# Patient Record
Sex: FEMALE | Race: WHITE | NOT HISPANIC OR LATINO | Employment: FULL TIME | URBAN - METROPOLITAN AREA
[De-identification: names, ages, dates, MRNs, and addresses within clinical notes are randomized per-mention and may not be internally consistent; named-entity substitution may affect disease eponyms.]

---

## 2020-12-01 ENCOUNTER — AMBULATORY - HEALTHEAST (OUTPATIENT)
Dept: CARDIOLOGY | Facility: CLINIC | Age: 35
End: 2020-12-01

## 2020-12-03 ENCOUNTER — COMMUNICATION - HEALTHEAST (OUTPATIENT)
Dept: CARDIOLOGY | Facility: CLINIC | Age: 35
End: 2020-12-03

## 2021-06-13 NOTE — TELEPHONE ENCOUNTER
Wellness Screening Tool  Symptom Screening:  Do you have one of the following NEW symptoms:    Fever (subjective or >100.0)?  No    A new cough?  No    Shortness of breath?  No     Chills? No     New loss of taste or smell? No     Generalized body aches? No     New persistent headache? No     New sore throat? No     Nausea, vomiting, or diarrhea?  No    Within the past 2 weeks, have you been exposed to someone with a known positive illness below:    COVID-19 (known or suspected)?  No    Chicken pox?  No    Mealses?  No    Pertussis?  No    Patient notified of visitor policy- No visitors are allowed to accompany the patient at this time. Yes Per email from leadership it is ok for patient bring her baby into the clinic.  Pt informed to wear a mask: Yes  Pt notified if they develop any symptoms listed above, prior to their appointment, they are to call the clinic directly at [phone] for further instructions.  Yes  Patient's appointment status: Patient will be seen in clinic as scheduled on 12/4

## 2021-06-26 ENCOUNTER — HEALTH MAINTENANCE LETTER (OUTPATIENT)
Age: 36
End: 2021-06-26

## 2021-10-16 ENCOUNTER — HEALTH MAINTENANCE LETTER (OUTPATIENT)
Age: 36
End: 2021-10-16

## 2022-07-17 ENCOUNTER — HEALTH MAINTENANCE LETTER (OUTPATIENT)
Age: 37
End: 2022-07-17

## 2022-09-25 ENCOUNTER — HEALTH MAINTENANCE LETTER (OUTPATIENT)
Age: 37
End: 2022-09-25

## 2023-05-16 ENCOUNTER — HOSPITAL ENCOUNTER (EMERGENCY)
Facility: HOSPITAL | Age: 38
Discharge: HOME | End: 2023-05-16
Attending: EMERGENCY MEDICINE | Admitting: EMERGENCY MEDICINE
Payer: COMMERCIAL

## 2023-05-16 ENCOUNTER — APPOINTMENT (OUTPATIENT)
Dept: CT IMAGING | Facility: HOSPITAL | Age: 38
End: 2023-05-16
Attending: EMERGENCY MEDICINE
Payer: COMMERCIAL

## 2023-05-16 VITALS
BODY MASS INDEX: 38.36 KG/M2 | OXYGEN SATURATION: 99 % | HEART RATE: 84 BPM | SYSTOLIC BLOOD PRESSURE: 107 MMHG | HEIGHT: 63 IN | RESPIRATION RATE: 22 BRPM | TEMPERATURE: 98.2 F | DIASTOLIC BLOOD PRESSURE: 68 MMHG | WEIGHT: 216.49 LBS

## 2023-05-16 DIAGNOSIS — S06.0X0A CONCUSSION WITHOUT LOSS OF CONSCIOUSNESS, INITIAL ENCOUNTER: ICD-10-CM

## 2023-05-16 PROCEDURE — 250N000013 HC RX MED GY IP 250 OP 250 PS 637: Performed by: EMERGENCY MEDICINE

## 2023-05-16 PROCEDURE — 70450 CT HEAD/BRAIN W/O DYE: CPT

## 2023-05-16 PROCEDURE — 99284 EMERGENCY DEPT VISIT MOD MDM: CPT | Mod: 25

## 2023-05-16 RX ORDER — METOCLOPRAMIDE 10 MG/1
10 TABLET ORAL EVERY 8 HOURS PRN
Qty: 12 TABLET | Refills: 0 | Status: SHIPPED | OUTPATIENT
Start: 2023-05-16

## 2023-05-16 RX ORDER — METOCLOPRAMIDE 10 MG/1
10 TABLET ORAL ONCE
Status: COMPLETED | OUTPATIENT
Start: 2023-05-16 | End: 2023-05-16

## 2023-05-16 RX ADMIN — METOCLOPRAMIDE 10 MG: 10 TABLET ORAL at 11:28

## 2023-05-16 ASSESSMENT — ACTIVITIES OF DAILY LIVING (ADL): ADLS_ACUITY_SCORE: 33

## 2023-05-16 NOTE — ED PROVIDER NOTES
Emergency Department Encounter     Evaluation Date & Time:   No admission date for patient encounter.    CHIEF COMPLAINT:  Head Injury      Triage Note:       Impression and Plan       FINAL IMPRESSION:    ICD-10-CM    1. Concussion without loss of consciousness, initial encounter  S06.0X0A Concussion  Referral            ED COURSE & MEDICAL DECISION MAKING:  10:58 AM I met with the patient for the initial interview and physical examination. Discussed plan for treatment and workup in the ED.      38 year old female, history of CHF and obesity, who presents for evaluation of a constant headache associated with dizziness, difficulty concentrating and memory troubles since sustaining a head injury 2 days ago. The patient fell backwards with her daughter in her arms hitting her head on a wooden chair. She denies LOC and is not anticoagulated.     Neuro exam normal, however given symptoms, joint decision made to obtain imaging. Head CT performed and was negative for acute intracranial abnormality.    Cervical spine clinically cleared.    Symptoms consistent with concussion.     Patient feeling better after po Reglan.  Patient discharged to home with follow-up with [facility] (a referral was ordered) and her PCP.  She was given a prescription for Reglan.  Return precautions provided.  Patient stable throughout ED course.      At the conclusion of the encounter I discussed the results of all the tests and the disposition. The questions were answered. The patient acknowledged understanding and was agreeable with the care plan.        Medical Decision Making    History:    Supplemental history from: Documented in chart, if applicable    External Record(s) reviewed: Documented in chart, if applicable. and Outpatient Record: telemedicine visit from 4/3/2023 for [employer] use for weight loss    Work Up:    Chart documentation includes differential considered and any EKGs or imaging independently interpreted  by provider, where specified.    In additional to work up documented, I considered the following work up: Documented in chart, if applicable. SEE ABOVE    Complicating factors:    Care impacted by chronic illness: Other: Congestive Heart Failure & Obesity    Care affected by social determinants of health: N/A    Disposition considerations: Discharge. I prescribed additional prescription strength medication(s) as charted. I considered admission, but ultimately discharged patient Given reassuring vitals, exam and evaluation.      MEDICATIONS GIVEN IN THE EMERGENCY DEPARTMENT:  Medications   metoclopramide (REGLAN) tablet 10 mg (10 mg Oral $Given 5/16/23 1128)       NEW PRESCRIPTIONS STARTED AT TODAY'S ED VISIT:  Discharge Medication List as of 5/16/2023 12:29 PM      START taking these medications    Details   metoclopramide (REGLAN) 10 MG tablet Take 1 tablet (10 mg) by mouth every 8 hours as needed (headaches), Disp-12 tablet, R-0, Local Print             HPI     HPI     Carolin Crump is a 38 year old female, history of CHF and obesity, who presents to this ED via private vehicle for evaluation of head injury.    On Sunday around noon (2 days ago), patient was crouched down to give her daughter a hug when her daughter jumped into her arms causing the patient and her daughter to fall backwards. The patient hit the back of her head very hard on a wooden chair. She denies LOC, but had to lie there for ~45 minutes before she could get up. Since, she has had a constant headache that she rates at a 7/10 in severity. She reports associated dizziness, difficulty with concentrating and trouble with her memory. She denies extremity weakness / paresthesias. She reports some chronic morning nausea that is unchanged; no new nausea / vomiting. She does not take any anticoagulants.     Patient is currently menstruating.     REVIEW OF SYSTEMS:  All other systems reviewed and are negative.      Medical History     History  "reviewed. No pertinent past medical history.    History reviewed. No pertinent surgical history.    History reviewed. No pertinent family history.         metoclopramide (REGLAN) 10 MG tablet        Physical Exam     First Vitals:  Patient Vitals for the past 24 hrs:   BP Temp Temp src Pulse Resp SpO2 Height Weight   05/16/23 1210 107/68 -- -- 84 22 99 % -- --   05/16/23 1155 112/70 -- -- 72 20 97 % -- --   05/16/23 1133 102/74 -- -- 78 18 98 % -- --   05/16/23 1111 109/71 -- -- 73 17 99 % -- --   05/16/23 1107 109/74 -- -- 70 -- 99 % -- --   05/16/23 1051 105/56 98.2  F (36.8  C) Oral 82 19 99 % 1.6 m (5' 3\") 98.2 kg (216 lb 7.9 oz)       PHYSICAL EXAM:   Physical Exam    GENERAL: Awake, alert.  In mild acute distress.   HEENT: Normocephalic, atraumatic. Pupils equal, round and reactive. Conjunctiva normal. EOMI without nystagmus.  TMs normal without hemotympanum.  Normal posterior oropharynx. Tongue is midline.   NECK: No midline tenderness to palpation of cervical spine.  No pain with full ROM neck.  No stridor.  PULMONARY: Symmetrical breath sounds without distress.  Lungs clear to auscultation bilaterally without wheezes, rhonchi or rales.  CARDIO: Regular rate and rhythm.  No significant murmur, rub or gallop.    ABDOMINAL: Abdomen soft, non-distended and non-tender to palpation.    EXTREMITIES: No lower extremity swelling or edema.      NEURO: Alert and oriented to person, place and time.  Cranial nerves III-XII intact.  Strength 5/5 BL upper and lower extremities with sensation to light touch grossly intact.  PSYCH: Normal mood and affect.      Results     RADIOLOGY:  Head CT w/o contrast   Final Result   IMPRESSION:   1.  No acute intracranial abnormality.          ICj, am serving as a scribe to document services personally performed by Cyndi Santizo MD based on my observation and the provider's statements to me. ICyndi MD attest that Cj Boyd is acting in a scribe capacity, has " observed my performance of the services and has documented them in accordance with my direction.    Cyndi Santizo MD  Emergency Medicine  [facility] EMERGENCY DEPARTMENT           Cyndi Santizo MD  05/17/23 4074

## 2023-05-16 NOTE — DISCHARGE INSTRUCTIONS
Please follow-up with the [facility] within 2 weeks. In the meantime, please follow-up with your Primary Care Provider for a recheck; call to arrange appointment.    Return to the ER for worsening symptoms, worsening headache, focal neurologic deficit (for example, facial droop or right arm weakness), excessive sleepiness, persistent nausea / vomiting or other concerns.

## 2023-05-16 NOTE — ED NOTES
Pt states there is no way she could be pregnant and that she does not feel a pregnancy test is necessary because she is on day 2 of her menstrual cycle. The Pt and Dr. Santizo state they are comfortable with Pt getting a head CT w/o a pregnancy test.    normal rate, regular rhythm, and no murmur.

## 2023-05-16 NOTE — ED TRIAGE NOTES
Pt had a head injury from a fall last Sunday accidentally hitting her back of head on a wooden chair.  NO LOC but had significant dizziness. Sent here post Tele consult to have Head CT.   Feeling trouble  Concentration and articulating     Triage Assessment     Row Name 05/16/23 1052       Triage Assessment (Adult)    Airway WDL WDL       Respiratory WDL    Respiratory WDL WDL       Skin Circulation/Temperature WDL    Skin Circulation/Temperature WDL WDL       Cardiac WDL    Cardiac WDL WDL       Peripheral/Neurovascular WDL    Peripheral Neurovascular WDL WDL       Cognitive/Neuro/Behavioral WDL    Cognitive/Neuro/Behavioral WDL X  headache, feeling having trouble with her articulation and dizziness    Level of Consciousness alert    Speech clear       [place] Coma Scale    Best Eye Response 4-->(E4) spontaneous    Best Motor Response 6-->(M6) obeys commands    Best Verbal Response 5-->(V5) oriented    [place] Coma Scale Score 15

## 2023-10-14 ENCOUNTER — HEALTH MAINTENANCE LETTER (OUTPATIENT)
Age: 38
End: 2023-10-14

## 2024-03-03 ENCOUNTER — TRANSFERRED RECORDS (OUTPATIENT)
Dept: HEALTH INFORMATION MANAGEMENT | Facility: CLINIC | Age: 39
End: 2024-03-03
Payer: COMMERCIAL

## 2024-03-03 ENCOUNTER — HOSPITAL ENCOUNTER (EMERGENCY)
Facility: HOSPITAL | Age: 39
Discharge: HOME | End: 2024-03-03
Attending: EMERGENCY MEDICINE | Admitting: EMERGENCY MEDICINE
Payer: COMMERCIAL

## 2024-03-03 VITALS
BODY MASS INDEX: 30.12 KG/M2 | HEART RATE: 72 BPM | SYSTOLIC BLOOD PRESSURE: 110 MMHG | OXYGEN SATURATION: 100 % | HEIGHT: 63 IN | WEIGHT: 170 LBS | RESPIRATION RATE: 22 BRPM | DIASTOLIC BLOOD PRESSURE: 64 MMHG | TEMPERATURE: 98.1 F

## 2024-03-03 DIAGNOSIS — R53.83 EXHAUSTION: ICD-10-CM

## 2024-03-03 DIAGNOSIS — R42 DIZZINESS: ICD-10-CM

## 2024-03-03 PROCEDURE — 93005 ELECTROCARDIOGRAM TRACING: CPT | Performed by: EMERGENCY MEDICINE

## 2024-03-03 PROCEDURE — 99283 EMERGENCY DEPT VISIT LOW MDM: CPT

## 2024-03-03 ASSESSMENT — COLUMBIA-SUICIDE SEVERITY RATING SCALE - C-SSRS
2. HAVE YOU ACTUALLY HAD ANY THOUGHTS OF KILLING YOURSELF IN THE PAST MONTH?: NO
1. IN THE PAST MONTH, HAVE YOU WISHED YOU WERE DEAD OR WISHED YOU COULD GO TO SLEEP AND NOT WAKE UP?: NO
6. HAVE YOU EVER DONE ANYTHING, STARTED TO DO ANYTHING, OR PREPARED TO DO ANYTHING TO END YOUR LIFE?: NO

## 2024-03-03 ASSESSMENT — ACTIVITIES OF DAILY LIVING (ADL)
ADLS_ACUITY_SCORE: 35
ADLS_ACUITY_SCORE: 35

## 2024-03-03 NOTE — ED TRIAGE NOTES
Pt arrives from  via EMS. Per EMS, pt has been feeling dizzy, lightheaded, and has had near syncopal episode for the past 4 days. [facility] Pt has been caring for daughter for many weeks without any rest. Reports passing out on the floor about 3 weeks ago and recalls waking up on a soft surface. Denies hitting head. On blood thinners. Pt had a pacemaker that has not been evaluated yet. Pt reports transmitting the syncopal episode to cardiology and they concluded it was not related to her heart condition. Pt alert and oriented in room. Vss. Denies pain. Reports feeling cold and is mildly shaking in bed. Oral temp is 98.1.      Triage Assessment (Adult)       Row Name 03/03/24 9013          Triage Assessment    Airway WDL WDL        Respiratory WDL    Respiratory WDL WDL        Skin Circulation/Temperature WDL    Skin Circulation/Temperature WDL WDL        Cardiac WDL    Cardiac WDL WDL        Peripheral/Neurovascular WDL    Peripheral Neurovascular WDL WDL        Cognitive/Neuro/Behavioral WDL    Cognitive/Neuro/Behavioral WDL WDL

## 2024-03-03 NOTE — ED NOTES
Expected Patient Referral to ED  3:23 PM    Referring Clinic/Provider:  [facility]     Reason for referral/Clinical facts:  38 year old F, history of HOCM s/p pacemaker placement (at [place]), presented to [facility] for urinary symptoms, but on further ROS reported that she has been having syncopal episodes x 3 days with lightheadedness.     EKG, troponin, BNP normal.    BP 90s systolic - abnormally low for her.     Was supposed to have pacemaker interrogated last week, but did not.      Recommendations provided:  Send to ED for further evaluation    Caller was informed that this institution does possess the capabilities and/or resources to provide for patient and should be transferred to our facility.    Discussed that if direct admit is sought and any hurdles are encountered, this ED would be happy to see the patient and evaluate.    Informed caller that recommendations provided are recommendations based only on the facts provided and that they responsible to accept or reject the advice, or to seek a formal in person consultation as needed and that this ED will see/treat patient should they arrive.      Cyndi Santizo MD  [facility] EMERGENCY DEPARTMENT  [address] [place] [id number]  [phone]       Cyndi Santizo MD  03/03/24 4228

## 2024-03-03 NOTE — ED NOTES
Bed: [facility]  Expected date:   Expected time:   Means of arrival:   Comments:  Allisabelle, 38F, hypotension, from [facility]

## 2024-03-03 NOTE — ED PROVIDER NOTES
EMERGENCY DEPARTMENT ENCOUNTER      NAME: Carolin Crump  AGE: 38 year old female  YOB: 1985  MRN: [id number]  EVALUATION DATE & TIME: 3/3/2024  4:08 PM    PCP: Fatimah [place]    ED PROVIDER: Cara Street M.D.      Chief Complaint   Patient presents with    Syncope    Dizziness         FINAL IMPRESSION:  1. Dizziness    2. Exhaustion        ED COURSE & MEDICAL DECISION MAKING:    Pertinent Labs & Imaging studies reviewed. (See chart for details)  4:25 PM I introduced myself to the patient, obtained patient history, performed a physical exam, and discussed plan for ED workup including potential diagnostic laboratory/imaging studies and interventions.    ED Course as of 03/03/24 2242   Sun Mar 03, 2024   1631 Patient is a pleasant 38-year-old female comes in today with 4 days of dizziness and lightheadedness mostly with walking.  She feels like she just may be exhausted.  She went to the emergency room concerned about a UTI and they did not find one there.  She has a history of a familial cardiomyopathy and has a recently implanted ICD with a pacemaker and defibrillator.  She has an EF of greater than 40%.  Her youngest child was recently diagnosed with pneumonia and she was up with him the first couple of weeks at home and then he spent some days in the PICU at [facility] and then she was continuing to care for the child after that.  Right now her children are with her ex- and they are being cared for.  She thinks she likely is just way behind on sleep and exhausted and that is why she is feeling this way.  She really would like to go home if possible.  [facility] sent her here because they could not interrogate her pacemaker and were concerned about these dizziness episodes.  She had 1 episode in the past of something similar and let the cardiology team know and they did not find any events associated with her symptoms.  Will interrogate her pacemaker here and then if it  looks okay and her labs at [facility] room look okay she could potentially just go home and get some rest.  She would prefer that.   1709 I reviewed the notes from [facility].  I reviewed the blood work.  Troponin was negative.  Labs looked okay.  Hemoglobin was 14   1758 Interrogation look good.  Patient has no events since the end of January.  At that time she was having some runs of V. tach but did not need any shocks.  We will work on getting her discharged home.  She is ready to go.  Somebody has her kids until tomorrow so she can go home and get some rest.  She is in agreement with the plan.       Medical Decision Making    History:  Supplemental history from: None  External Record(s) reviewed: I reviewed the note from [facility] from today.  See details above.    Work Up:  Emergent/Severe conditions considered and evaluated for: Arrhythmia  I independently reviewed and interpreted EKG  In additional to work up documented, I considered the following work up: None  Medications given that require intensive monitoring for toxicity: None    External consultation:  Discussion of management with another provider: None    Complicating factors:  Care impacted by chronic illness: Cardiomyopathy  Care affected by social determinants of health: Patient has had a sick child and has been neglecting to care for herself.    Disposition considerations: Considered admission but after looking at the workup done at [facility] and reviewing her interrogation here and having a discussion with her I felt that the best place for her to be was at home.  She was very much in agreement with this plan.  Prescriptions considered/prescribed: None    At the conclusion of the encounter I discussed  the results of all of the tests and the disposition with patient.   All questions were answered.  The patient acknowledged understanding and was involved in the decision making regarding the overall care plan.      I discussed with patient the  utility, limitations and findings of the exam/interventions/studies done during this visit as well as the list of differential diagnosis and symptoms to monitor/return to ER for.  Additional verbal discharge instructions were provided.     MEDICATIONS GIVEN IN THE EMERGENCY:  Medications - No data to display    NEW PRESCRIPTIONS STARTED AT TODAY'S ER VISIT  Discharge Medication List as of 3/3/2024  6:01 PM             =================================================================    HPI    Triage Note: Pt arrives from  via EMS. Per EMS, pt has been feeling dizzy, lightheaded, and has had near syncopal episode for the past 4 days. Pt reports daughter admitted to inpatient at [facility]. Pt has been caring for daughter for many weeks without any rest. Reports passing out on the floor about 3 weeks ago and recalls waking up on a soft surface. Denies hitting head. On blood thinners. Pt had a pacemaker that has not been evaluated yet. Pt reports transmitting the syncopal episode to cardiology and they concluded it was not related to her heart condition. Pt alert and oriented in room. Vss. Denies pain. Reports feeling cold and is mildly shaking in bed. Oral temp is 98.1.      Triage Assessment (Adult)       Row Name 03/03/24 1613          Triage Assessment    Airway WDL WDL        Respiratory WDL    Respiratory WDL WDL        Skin Circulation/Temperature WDL    Skin Circulation/Temperature WDL WDL        Cardiac WDL    Cardiac WDL WDL        Peripheral/Neurovascular WDL    Peripheral Neurovascular WDL WDL        Cognitive/Neuro/Behavioral WDL    Cognitive/Neuro/Behavioral WDL WDL                   Patient information was obtained from: Patient     Use of : N/A       Carolin Crump is a 38 year old female who presents with dizziness.     Patient reports 4 days of dizziness. She had a ICD placed on 11/8/2023. Patient believes that she is just exhausted. Her 3 year old has been sick for the past 4  "weeks with pneumonia and was hospitalized, so patient has been taking care of her daughter and has gotten little sleep. She also has not been eating or drinking adequately. She went to [facility] and was sent here to interrogate her ICD. She denies chest pain, shortness of breath, leg swelling.     PAST MEDICAL HISTORY:  No past medical history on file.    PAST SURGICAL HISTORY:  No past surgical history on file.    CURRENT MEDICATIONS:    No current facility-administered medications for this encounter.    Current Outpatient Medications:     metoclopramide (REGLAN) 10 MG tablet, Take 1 tablet (10 mg) by mouth every 8 hours as needed (headaches), Disp: 12 tablet, Rfl: 0    ALLERGIES:  No Known Allergies    FAMILY HISTORY:  No family history on file.    SOCIAL HISTORY:   Social History     Socioeconomic History    Marital status:        PHYSICAL EXAM    VITAL SIGNS: /64   Pulse 72   Temp 98.1  F (36.7  C) (Oral)   Resp 22   Ht 1.6 m (5' 3\")   Wt 77.1 kg (170 lb)   SpO2 100%   BMI 30.11 kg/m     GENERAL: Awake, alert, answering questions appropriately, appears fatigued  SPEECH:  Easy to understand speech, Normal volume and se  PULMONARY: No respiratory distress, Lungs clear to auscultation bilaterally  CARDIOVASCULAR: Regular rate and rhythm, Distal pulses present and normal.  ABDOMINAL: Soft, Nondistended, Nontender, No rebound or guarding, No palpable masses  EXTREMITIES: No lower extremity edema.  PSYCH: Normal mood and affect     EKG:    Date and time: March 3, 2024 1610  Rate: 72 bpm  Rhythm: Sinus rhythm  DE interval: 158 ms  QRS interval: 96 ms  QT/QTc: 474/519 ms  ST changes or T wave changes: No acute ST or T wave abnormalities  Change from prior ECG: No significant change from prior   I have independently reviewed and interpreted this EKG.       I, Olegario Sequeira, am serving as a scribe to document services personally performed by Dr. Street based on my observation and the provider's " statements to me. I, Cara Street MD attest that Olegario Sequeira is acting in a scribe capacity, has observed my performance of the services and has documented them in accordance with my direction.    Cara Street M.D.  Emergency Medicine  [facility] EMERGENCY DEPARTMENT  [facility] [facility] [address]  [phone]  Dept: [phone]       Cara Street MD  03/03/24 9577

## 2024-03-04 NOTE — DISCHARGE INSTRUCTIONS
You were seen in the Emergency Department today for evaluation of some dizziness that I think is likely related to exhaustion.  Your cardiac interrogation looked good.  Get some rest and take care of yourself.  Follow up with your primary care physician to ensure resolution of symptoms. Return if you have new or worsening symptoms.

## 2024-03-21 LAB
ATRIAL RATE - MUSE: 72 BPM
DIASTOLIC BLOOD PRESSURE - MUSE: 63 MMHG
INTERPRETATION ECG - MUSE: NORMAL
P AXIS - MUSE: 47 DEGREES
PR INTERVAL - MUSE: 158 MS
QRS DURATION - MUSE: 96 MS
QT - MUSE: 474 MS
QTC - MUSE: 519 MS
R AXIS - MUSE: 84 DEGREES
SYSTOLIC BLOOD PRESSURE - MUSE: 105 MMHG
T AXIS - MUSE: 67 DEGREES
VENTRICULAR RATE- MUSE: 72 BPM

## 2025-02-15 ENCOUNTER — HEALTH MAINTENANCE LETTER (OUTPATIENT)
Age: 40
End: 2025-02-15

## 2025-03-31 ENCOUNTER — ANCILLARY ORDERS (OUTPATIENT)
Dept: MRI IMAGING | Facility: HOSPITAL | Age: 40
End: 2025-03-31
Payer: COMMERCIAL

## 2025-03-31 DIAGNOSIS — M54.50 LOW BACK PAIN: Primary | ICD-10-CM

## 2025-04-01 ENCOUNTER — TELEPHONE (OUTPATIENT)
Dept: MRI IMAGING | Facility: HOSPITAL | Age: 40
End: 2025-04-01
Payer: COMMERCIAL

## 2025-04-01 NOTE — TELEPHONE ENCOUNTER
Reason for call: MR Device Safety Clearance    Please create a MR Device Safety order  Patient is reporting patient has ICD  Type of MR exam: lumbar mri    Do you get your Device checked at [facility]?: No - Where do you get your device checked (clinic name and location): [facility]

## 2025-04-01 NOTE — TELEPHONE ENCOUNTER
Is the implanted device safe for MRI Exam? Yes  Is this device 3T compatible? No 1.5 T Only   Device Type: ICD      Device Information: [employer], Single Chamber and ICD Dynagen MRI      Cardiology Orders for Device Programming     -- Yes -- The patient has a MRI conditional pulse generator and leads from the same     -- Yes -- The pulse generator and leads have been implanted for at least 6 weeks. (Does not apply to [employer] devices)    -- Yes-- The device is implanted in the right or left pectoral region    -- NA   -There are not any additional active cardiac devices, abandoned leads, lead extenders or adapters. NON-FV DEVICE PATIENT; PLEASE COMPLETE CXR PRIOR TO MRI PER RADIOLOGY PROTOCOL    -- Yes -- The device lead impedance measurements are within the normal range per  guidelines    -- N/A -- If the patient is pacemaker dependent the thresholds are less than or equal to 2.0V @ 0.4ms.    -- Yes -- RA and RV leads are programmed to bipolar pacing operation or pacing off. If no, CONTACT THE DEVICE REP FOR PROGRAMMING     Date of last Remote Device check: 1/17/25  Results of last Device check:  1.   Right atrium impedance: n/a   2.   Right ventricle impedance: 526 Ohms   3.   Left ventricle impedance: n/a      4.   Right atrium threshold: n/a   5.   Right ventricle threshold: 0.6V @ 0.4 ms   6.   Left ventricle threshold: n/a      Device programming during the scan guidelines   Pacing Mode (check one): ODO or OFF (0% )  Pacing Rate: na   If remote reprogramming is applicable, please contact the [employer] Rep to assist     Juhi Lezama RN

## 2025-04-07 ENCOUNTER — ANCILLARY ORDERS (OUTPATIENT)
Dept: MRI IMAGING | Facility: HOSPITAL | Age: 40
End: 2025-04-07
Payer: COMMERCIAL

## 2025-04-07 DIAGNOSIS — M54.50 LOW BACK PAIN: Primary | ICD-10-CM

## 2025-04-15 ENCOUNTER — HOSPITAL ENCOUNTER (OUTPATIENT)
Dept: MRI IMAGING | Facility: CLINIC | Age: 40
Discharge: HOME | End: 2025-04-15
Attending: PHYSICIAN ASSISTANT
Payer: COMMERCIAL

## 2025-04-15 ENCOUNTER — HOSPITAL ENCOUNTER (OUTPATIENT)
Dept: RADIOLOGY | Facility: CLINIC | Age: 40
Discharge: HOME | End: 2025-04-15
Attending: PHYSICIAN ASSISTANT
Payer: COMMERCIAL

## 2025-04-15 VITALS — OXYGEN SATURATION: 98 %

## 2025-04-15 DIAGNOSIS — M54.50 LOW BACK PAIN: ICD-10-CM

## 2025-04-15 PROCEDURE — 71045 X-RAY EXAM CHEST 1 VIEW: CPT

## 2025-04-15 PROCEDURE — 72148 MRI LUMBAR SPINE W/O DYE: CPT
